# Patient Record
Sex: MALE | Race: WHITE | Employment: OTHER | URBAN - METROPOLITAN AREA
[De-identification: names, ages, dates, MRNs, and addresses within clinical notes are randomized per-mention and may not be internally consistent; named-entity substitution may affect disease eponyms.]

---

## 2017-04-03 ENCOUNTER — TELEPHONE (OUTPATIENT)
Dept: VASCULAR SURGERY | Age: 72
End: 2017-04-03

## 2017-04-03 NOTE — TELEPHONE ENCOUNTER
Patient called to cancel his studies and follow up appointment until further notice, states he's having some personal issues.

## 2017-05-25 ENCOUNTER — OFFICE VISIT (OUTPATIENT)
Dept: VASCULAR SURGERY | Age: 72
End: 2017-05-25

## 2017-05-25 DIAGNOSIS — I65.21 CAROTID STENOSIS, ASYMPTOMATIC, RIGHT: ICD-10-CM

## 2017-05-25 NOTE — PROCEDURES
[facility]   *** FINAL REPORT ***    Name: Garland Flowers  MRN: [id number]       Outpatient  : 05 Dec 1945  HIS Order #: [id number]  [address] [facility] Visit #: [id number]  Date: 25 May 2017    TYPE OF TEST: Cerebrovascular Duplex    REASON FOR TEST  Carotid stenosis    Right Carotid:-             Proximal               Mid                 Distal  cm/s  Systolic  Diastolic  Systolic  Diastolic  Systolic  Diastolic  CCA:     04.5      14.0       81.0      16.0       85.0      16.0  Bulb:   204.0      49.0  ECA:    109.0      17.0  ICA:    256.0      42.0       66.0      15.0       59.0      17.0  ICA/CCA:  3.0       2.6    ICA Stenosis: 50-69%    Right Vertebral:-  Finding: Antegrade  Sys:       50.0  Sheba:       10.0    Right Subclavian:    Left Carotid:-            Proximal                Mid                 Distal  cm/s  Systolic  Diastolic  Systolic  Diastolic  Systolic  Diastolic  CCA:     85.8      17.0       74.0      16.0       62.0      10.0  Bulb:    72.0      15.0  ECA:    113.0      16.0  ICA:     60.0      11.0       89.0      24.0       87.0      24.0  ICA/CCA:  1.0       1.4    ICA Stenosis: <50%    Left Vertebral:-  Finding: Antegrade  Sys:       54.0  Sheba:       15.0    Left Subclavian:    INTERPRETATION/FINDINGS  Duplex images were obtained using 2-D gray scale, color flow and  spectral doppler analysis. 1. Moderate 50-69% stenosis in the right internal carotid artery. 2. Mild plaquing of the left internal carotid artery in the less than  50% range. 3. No significant stenosis in the external carotid arteries  bilaterally. 4. Antegrade flow in both vertebral arteries. Plaque Morphology:  1. Heterogeneous plaque in the bulb and right ICA. 2. Heterogeneous plaque in the bulb and left ICA. Compared to the previous study on 16 the velocities have continued   to increase in the right ICA with the disease remaining in the  moderate range.     ADDITIONAL COMMENTS    I have personally reviewed the data relevant to the interpretation of  this  study. TECHNOLOGIST: Ousmane Quiros RVT, RICARDO  Signed: 05/25/2017 11:39 AM    PHYSICIAN: Dandy Nayak D.O.   Signed: 05/26/2017 01:18 PM

## 2017-05-30 ENCOUNTER — OFFICE VISIT (OUTPATIENT)
Dept: VASCULAR SURGERY | Age: 72
End: 2017-05-30

## 2017-05-30 VITALS
DIASTOLIC BLOOD PRESSURE: 62 MMHG | SYSTOLIC BLOOD PRESSURE: 130 MMHG | WEIGHT: 195 LBS | BODY MASS INDEX: 27.92 KG/M2 | HEART RATE: 72 BPM | RESPIRATION RATE: 18 BRPM | HEIGHT: 70 IN

## 2017-05-30 DIAGNOSIS — I65.23 CAROTID STENOSIS, ASYMPTOMATIC, BILATERAL: Primary | ICD-10-CM

## 2017-05-30 NOTE — PROGRESS NOTES
Valdez Calderón    Chief Complaint   Patient presents with   [facility] Carotid Artery Stenosis       History and Physical    Mr Bee Barrera is here for yearly carotid surveillance. He has been coming for a number of years after a screening baseline doppler had shown some moderate disease. He has remained stable within that range that we just continue with yearly check ups. He is otherwise healthy with his risk factors well controlled. He continues to deny any claudication or leg edema    Past Medical History:   Diagnosis Date    Arthropathy, unspecified, other specified sites     Back pain     Diabetes mellitus ([address]     Easy bruising     Hypertension     Metabolic disease     Skin rash     Vision decreased      Patient Active Problem List   Diagnosis Code    Hypertension I10    Diabetes mellitus ([address] A00.3    Metabolic disease S39.3    Vision decreased H54.7    Arthropathy, unspecified, other specified sites M12.9    Back pain M54.9    Skin rash R21    Easy bruising R23.8     History reviewed. No pertinent surgical history. Current Outpatient Prescriptions   Medication Sig Dispense Refill    glyBURIDE-metFORMIN (GLUCOVANCE) 5-500 mg per tablet Take 1 Tab by mouth Daily (before breakfast).  methimazole (TAPAZOLE) 5 mg tablet Take  by mouth.  CHOLECALCIFEROL, VITAMIN D3, (VITAMIN D3 PO) Take  by mouth.  lisinopril (PRINIVIL) 5 mg tablet Take 40 mg by mouth daily.  simvastatin (ZOCOR) 40 mg tablet Take  by mouth nightly.  metoprolol (LOPRESSOR) 50 mg tablet Take  by mouth two (2) times a day.  Aspirin, Buffered 81 mg Tab Take  by mouth.  alendronate (FOSAMAX) 35 mg tablet Take  by mouth every seven (7) days.  CALCIUM CARBONATE (CALTRATE 600 PO) Take  by mouth.          No Known Allergies    Review of Systems    Review of Systems - History obtained from the patient  General ROS: negative  Ophthalmic ROS: negative  Psychological ROS: negative  Respiratory ROS: negative  Cardiovascular ROS: negative  Gastrointestinal ROS: negative  Musculoskeletal ROS: negative  Neurological ROS: no TIA or stroke symptoms  Dermatological ROS: negative  Vascular ROS: no claudication or leg edema    Physical   Visit Vitals    /62 (BP 1 Location: Left arm, BP Patient Position: Sitting)    Pulse 72    Resp 18    Ht 5' 10\" (1.778 m)    Wt 195 lb (88.5 kg)    BMI 27.98 kg/m2     General:   Alert, cooperative, no distress.    Head:   Normocephalic, without obvious abnormality, atraumatic.    Eyes:     Conjunctivae/corneas clear. Pupils equal, round, reactive to light. Extraocular movements intact.    Neck:          Right sided bruit    Lungs:    Clear to auscultation bilaterally.    Heart:   Regular rate and rhythm, S1, S2 normal   Extremities:  Extremities normal, atraumatic, no cyanosis or edema.    Pulses:  2+ and symmetric all extremities.    Skin:  Skin color, texture, turgor normal. No rashes or lesions       Vascular studies:  1. Moderate 50-69% stenosis in the right internal carotid artery. 2. Mild plaquing of the left internal carotid artery in the less than  50% range. 3. No significant stenosis in the external carotid arteries  bilaterally. 4. Antegrade flow in both vertebral arteries. Plaque Morphology:  1. Heterogeneous plaque in the bulb and right ICA. 2. Heterogeneous plaque in the bulb and left ICA. Compared to the previous study on 4-4-16 the velocities have continued  to increase in the right ICA with the disease remaining in the  moderate range    Impression/Plan:     ICD-10-CM ICD-9-CM    1. Carotid stenosis, asymptomatic, bilateral I65.23 433.10 DUPLEX CAROTID BILATERAL AMB     433.30      Orders Placed This Encounter    DUPLEX CAROTID BILATERAL AMB     Reviewed results over last few years.    In spite of noted increase in velocities as stated above, there has been mild fluctuations since at least review back to his 2012 results, hovering perhaps just below or just above 50% stenosis. These current velocities are higher than last year, but lower than 2012. So I would say he is reasonable stable and can continue yearly surveillance   Total time spent was 15 minutes    Follow-up Disposition:  Return in about 1 year (around 5/30/2018). CHARANJIT Khan    Portions of this note have been entered using voice recognition software.

## 2018-05-14 ENCOUNTER — OFFICE VISIT (OUTPATIENT)
Dept: VASCULAR SURGERY | Age: 73
End: 2018-05-14

## 2018-05-14 DIAGNOSIS — I65.23 CAROTID STENOSIS, ASYMPTOMATIC, BILATERAL: ICD-10-CM

## 2018-05-31 ENCOUNTER — OFFICE VISIT (OUTPATIENT)
Dept: VASCULAR SURGERY | Age: 73
End: 2018-05-31

## 2018-05-31 VITALS
DIASTOLIC BLOOD PRESSURE: 80 MMHG | WEIGHT: 195 LBS | HEIGHT: 70 IN | BODY MASS INDEX: 27.92 KG/M2 | SYSTOLIC BLOOD PRESSURE: 140 MMHG | HEART RATE: 76 BPM

## 2018-05-31 DIAGNOSIS — I65.21 CAROTID STENOSIS, ASYMPTOMATIC, RIGHT: Primary | ICD-10-CM

## 2018-05-31 DIAGNOSIS — R09.89 RIGHT CAROTID BRUIT: ICD-10-CM

## 2018-05-31 RX ORDER — METFORMIN HYDROCHLORIDE 500 MG/1
500 TABLET ORAL
Qty: 1 TAB | Refills: 0 | Status: SHIPPED | OUTPATIENT
Start: 2018-05-31

## 2018-05-31 NOTE — MR AVS SNAPSHOT
[address] [facility] 
[phone] Patient: Diane Devries MRN: [id number] NNA:39/8/2029 Visit Information Date & Time Provider Department Dept. Phone Encounter #  
 5/31/2018 10:00 AM [facility] [phone] [id number] Follow-up Instructions Return in about 1 year (around 5/31/2019). Your Appointments 5/30/2019 10:00 AM  
PROCEDURE with [facility] 2 [facility] ([facility]) Appt Note: cv 1 yr/knaak [address] [facility] [address] [facility]  
[phone] [facility] [facility]  
  
    
 6/6/2019 11:30 AM  
Follow Up with [facility] ([facility]) Appt Note: 1 yr follow up after studies [address] [facility] [address] [facility]  
[phone] [address] [facility] [address] [facility] Upcoming Health Maintenance Date Due Hepatitis C Screening 1945 FOOT EXAM Q1 12/5/1955 EYE EXAM RETINAL OR DILATED Q1 12/5/1955 FOBT Q 1 YEAR AGE 50-75 12/5/1995 ZOSTER VACCINE AGE 60> 10/5/2005 GLAUCOMA SCREENING Q2Y 12/5/2010 Pneumococcal 65+ Low/Medium Risk (1 of 2 - PCV13) 12/5/2010 HEMOGLOBIN A1C Q6M 3/16/2011 MICROALBUMIN Q1 6/2/2011 LIPID PANEL Q1 9/14/2011 Influenza Age 5 to Adult 8/1/2018 DTaP/Tdap/Td series (2 - Td) 12/23/2025 Allergies as of 5/31/2018  Review Complete On: 5/31/2018 By: Ethel Cornell LPN No Known Allergies Current Immunizations  Never Reviewed Name Date Tdap 12/23/2015  2:38 AM  
  
 Not reviewed this visit You Were Diagnosed With   
  
 Codes Comments Carotid stenosis, asymptomatic, right    -  Primary ICD-10-CM: F81.81 ICD-9-CM: 433.10 Vitals BP Pulse Height(growth percentile) Weight(growth percentile) BMI Smoking Status 140/80 (BP 1 Location: Left arm, BP Patient Position: Sitting) 76 5' 10\" (1.778 m) 195 lb (88.5 kg) 27.98 kg/m2 Never Smoker BMI and BSA Data Body Mass Index Body Surface Area  
 27.98 kg/m 2 2.09 m 2 Your Updated Medication List  
  
   
This list is accurate as of 5/31/18 10:30 AM.  Always use your most recent med list.  
  
  
  
  
 aspirin, buffered 81 mg Tab Take  by mouth. CALTRATE 600 PO Take  by mouth. FOSAMAX 35 mg tablet Generic drug:  alendronate Take  by mouth every seven (7) days. LOPRESSOR 50 mg tablet Generic drug:  metoprolol tartrate Take  by mouth two (2) times a day. metFORMIN 500 mg tablet Commonly known as:  GLUCOPHAGE Take 1 Tab by mouth daily (with breakfast). methIMAzole 5 mg tablet Commonly known as:  TAPAZOLE Take  by mouth. PRINIVIL 5 mg tablet Generic drug:  lisinopril Take 40 mg by mouth daily. VITAMIN D3 PO Take  by mouth. ZOCOR 40 mg tablet Generic drug:  simvastatin Take  by mouth nightly. Prescriptions Printed Refills  
 metFORMIN (GLUCOPHAGE) 500 mg tablet 0 Sig: Take 1 Tab by mouth daily (with breakfast). Class: Print Route: Oral  
  
Follow-up Instructions Return in about 1 year (around 5/31/2019). To-Do List   
 05/31/2019 Imaging:  DUPLEX CAROTID BILATERAL AMB Introducing [facility]! [employer] introduces [employer] patient portal. Now you can access parts of your medical record, email your doctor's office, and request medication refills online. 1. In your internet browser, go to [email or web address] 2. Click on the First Time User? Click Here link in the Sign In box. You will see the New Member Sign Up page. 3. Enter your [employer] Access Code exactly as it appears below.  You will not need to use this code after youve completed the sign-up process. If you do not sign up before the expiration date, you must request a new code. · [employer] Access Code: [email or web address] Expires: 8/12/2018  9:37 AM 
 
4. Enter the last four digits of your Social Security Number (xxxx) and Date of Birth (mm/dd/yyyy) as indicated and click Submit. You will be taken to the next sign-up page. 5. Create a [employer] ID. This will be your [employer] login ID and cannot be changed, so think of one that is secure and easy to remember. 6. Create a [employer] password. You can change your password at any time. 7. Enter your Password Reset Question and Answer. This can be used at a later time if you forget your password. 8. Enter your e-mail address. You will receive e-mail notification when new information is available in [address] 9. Click Sign Up. You can now view and download portions of your medical record. 10. Click the Download Summary menu link to download a portable copy of your medical information. If you have questions, please visit the Frequently Asked Questions section of the [employer] website. Remember, [employer] is NOT to be used for urgent needs. For medical emergencies, dial 911. Now available from your iPhone and Android! Please provide this summary of care documentation to your next provider. Your primary care clinician is listed as Kristi Jang. If you have any questions after today's visit, please call [phone].

## 2018-05-31 NOTE — PROGRESS NOTES
Jessica Rapp    Chief Complaint   Patient presents with   [facility] Carotid Artery Stenosis       History and Physical    Mr Anjali Patrick is here for yearly carotid surveillance. He has been coming for a number of years after a screening baseline doppler had shown some moderate disease. He has remained stable within that range that we just continue with yearly check ups. I was able to retreive results back to 2012, which have been stable  He is otherwise healthy with his risk factors well controlled. He continues to deny any claudication or leg edema  Over the course of the last 2 years with diet and exercise (mostly diet with portion control and health choices) he has gone from 230 to 178 lbs and feels great    Past Medical History:   Diagnosis Date    Arthropathy, unspecified, other specified sites     Back pain     Diabetes mellitus ([address]     Easy bruising     Hypertension     Metabolic disease     Skin rash     Vision decreased      Patient Active Problem List   Diagnosis Code    Hypertension I10    Diabetes mellitus ([address] S51.4    Metabolic disease R79.2    Vision decreased H54.7    Arthropathy, unspecified, other specified sites M12.9    Back pain M54.9    Skin rash R21    Easy bruising R23.8     History reviewed. No pertinent surgical history. Current Outpatient Prescriptions   Medication Sig Dispense Refill    metFORMIN (GLUCOPHAGE) 500 mg tablet Take 1 Tab by mouth daily (with breakfast). 1 Tab 0    methimazole (TAPAZOLE) 5 mg tablet Take  by mouth.  CHOLECALCIFEROL, VITAMIN D3, (VITAMIN D3 PO) Take  by mouth.  lisinopril (PRINIVIL) 5 mg tablet Take 40 mg by mouth daily.  simvastatin (ZOCOR) 40 mg tablet Take  by mouth nightly.  metoprolol (LOPRESSOR) 50 mg tablet Take  by mouth two (2) times a day.  Aspirin, Buffered 81 mg Tab Take  by mouth.  alendronate (FOSAMAX) 35 mg tablet Take  by mouth every seven (7) days.         CALCIUM CARBONATE (CALTRATE 600 PO) Take  by mouth. No Known Allergies    Review of Systems    Review of Systems - History obtained from the patient  General ROS: negative  Ophthalmic ROS: negative  Psychological ROS: negative  Respiratory ROS: negative  Cardiovascular ROS: negative  Gastrointestinal ROS: negative  Musculoskeletal ROS: negative  Neurological ROS: no TIA or stroke symptoms  Dermatological ROS: negative  Vascular ROS: no claudication or leg edema    Physical   Visit Vitals    /80 (BP 1 Location: Left arm, BP Patient Position: Sitting)    Pulse 76    Ht 5' 10\" (1.778 m)    Wt 195 lb (88.5 kg)    BMI 27.98 kg/m2     General:   Alert, cooperative, no distress.    Head:   Normocephalic, without obvious abnormality, atraumatic.    Eyes:     Conjunctivae/corneas clear. Pupils equal, round, reactive to light. Extraocular movements intact.    Neck:          Right sided bruit    Lungs:    Clear to auscultation bilaterally.    Heart:   Regular rate and rhythm, S1, S2 normal   Extremities:  Extremities normal, atraumatic, no cyanosis or edema.    Pulses:  2+ and symmetric all extremities.    Skin:  Skin color, texture, turgor normal. No rashes or lesions       Vascular studies:  1. Moderate 50-69% stenosis in the right internal carotid artery. 2. Minimal plaquing visualized with <50% stenosis by criteria in the  left internal carotid artery. 3. No significant stenosis in the external carotid arteries  bilaterally. 4. Antegrade flow in both vertebral arteries. Plaque Morphology:  1. Irregular hyperechoic plaque in the bulb and right ICA. 2. Homogeneous plaque in the bulb and left ICA. Compared to prior exam, there is no significant change.       Impression/Plan:     ICD-10-CM ICD-9-CM    1. Carotid stenosis, asymptomatic, right I65.21 433.10 metFORMIN (GLUCOPHAGE) 500 mg tablet      DUPLEX CAROTID BILATERAL AMB   2.  Right carotid bruit R09.89 785.9      Orders Placed This Encounter    DUPLEX CAROTID BILATERAL AMB    metFORMIN (GLUCOPHAGE) 500 mg tablet     Reassured of overall stability of results since 2012 and that can continue yearly surveillance     Follow-up Disposition:  Return in about 1 year (around 5/31/2019). CHARANJIT Newberry    Portions of this note have been entered using voice recognition software.

## 2019-06-26 ENCOUNTER — TELEPHONE (OUTPATIENT)
Dept: CARDIOLOGY CLINIC | Age: 74
End: 2019-06-26

## 2019-06-26 NOTE — TELEPHONE ENCOUNTER
Patient had new pt appt with DR Hubert Modi 6/27/19 note stated referred by Dr. Nancy Petersen. .. I called pt to confirm appt pt states he did not need this appt that he has a cardiologist Dr Alex Freitas. .. Pt canceled appt. I called Dr Jamir Graf office spoke to [facility] made her aware she said she will forward note to Dr Nancy Petersen to make her aware.   Tea Farooq

## 2019-07-12 ENCOUNTER — OFFICE VISIT (OUTPATIENT)
Dept: VASCULAR SURGERY | Age: 74
End: 2019-07-12

## 2019-07-12 VITALS
WEIGHT: 195 LBS | RESPIRATION RATE: 17 BRPM | SYSTOLIC BLOOD PRESSURE: 120 MMHG | HEART RATE: 88 BPM | HEIGHT: 70 IN | DIASTOLIC BLOOD PRESSURE: 60 MMHG | BODY MASS INDEX: 27.92 KG/M2

## 2019-07-12 DIAGNOSIS — I65.21 CAROTID STENOSIS, ASYMPTOMATIC, RIGHT: Primary | ICD-10-CM

## 2019-07-12 RX ORDER — ATORVASTATIN CALCIUM 20 MG/1
TABLET, FILM COATED ORAL DAILY
COMMUNITY
End: 2022-10-27 | Stop reason: SDUPTHER

## 2019-07-12 NOTE — PROGRESS NOTES
1. Have you been to an emergency room or urgent care clinic since your last visit? NO    Hospitalized since your last visit? If yes, where, when, and reason for visit? NO  2. Have you seen or consulted any other health care providers outside of the [facility] since your last visit including any procedures, health maintenance items. If yes, where, when and reason for visit?  NO

## 2019-07-12 NOTE — PROGRESS NOTES
Margie Yi    Chief Complaint   Patient presents with    Carotid Artery Stenosis       History and Physical    66-year-old male here today for follow-up evaluation regarding his carotid surveillance. He is a healthy youthful male treated for diabetes and hypertension. He tells me he has had no TIAs no feeling of mini stroke or strokes. He has no chest pain no shortness of breath. Stays very active and feels well. He takes Lipitor and aspirin. He is a non-smoker. He has no claudication    Past Medical History:   Diagnosis Date    Arthropathy, unspecified, other specified sites     Back pain     Diabetes mellitus ([address]     Easy bruising     Hypertension     Metabolic disease     Skin rash     Vision decreased      Patient Active Problem List   Diagnosis Code    Hypertension I10    Diabetes mellitus ([facility] Z31.5    Metabolic disease B69.0    Vision decreased H54.7    Arthropathy, unspecified, other specified sites M12.9    Back pain M54.9    Skin rash R21    Easy bruising R23.8     History reviewed. No pertinent surgical history. Current Outpatient Medications   Medication Sig Dispense Refill    atorvastatin (LIPITOR) 20 mg tablet Take  by mouth daily.  metFORMIN (GLUCOPHAGE) 500 mg tablet Take 1 Tab by mouth daily (with breakfast). 1 Tab 0    methimazole (TAPAZOLE) 5 mg tablet Take  by mouth.  CHOLECALCIFEROL, VITAMIN D3, (VITAMIN D3 PO) Take  by mouth.  lisinopril (PRINIVIL) 5 mg tablet Take 40 mg by mouth daily.  metoprolol (LOPRESSOR) 50 mg tablet Take  by mouth two (2) times a day.  Aspirin, Buffered 81 mg Tab Take  by mouth.  alendronate (FOSAMAX) 35 mg tablet Take  by mouth every seven (7) days.  CALCIUM CARBONATE (CALTRATE 600 PO) Take  by mouth.  simvastatin (ZOCOR) 40 mg tablet Take  by mouth nightly.          No Known Allergies    Review of Systems    A full review of systems was completed times ten organ systems and was deemed negative unless otherwise mentioned in the HPI. Physical   Visit Vitals  /60 (BP 1 Location: Left arm, BP Patient Position: Sitting)   Pulse 88   Resp 17   Ht 5' 10\" (1.778 m)   Wt 195 lb (88.5 kg)   BMI 27.98 kg/m²       Youthful 73 no distress  Head is normocephalic no facial symmetry pupils are equal  Neck no JVD  Chest clear  Cardiac regular  Abdomen soft flat nontender  Extremities range of motion strength are equal is no edema  No skin ulcerations  No signs of acute arterial insufficiency  Doppler study shows moderate atherosclerosis of the right with peak systolic velocity of 614 (continues to decrease in parentheses mild on the left    Impression/Plan:     ICD-10-CM ICD-9-CM    1. Carotid stenosis, asymptomatic, right I65.21 433.10 DUPLEX CAROTID BILATERAL     Orders Placed This Encounter    atorvastatin (LIPITOR) 20 mg tablet       Follow-up and Dispositions    · Return in about 2 years (around 7/12/2021). Moderate carotid stenosis responds well to best medical therapy. Continue current medications. We will follow-up with surveillance ultrasound in 2 years. Oj Enriquez MD    PLEASE NOTE:  This document has been produced using voice recognition software. Unrecognized errors in transcription may be present.

## 2019-07-12 NOTE — LETTER
7/12/19 Patient: Shreyas Levine YOB: 1945 Date of Visit: 7/12/2019 Yulisa Henderson MD 
[address] [address] VIA Facsimile: [phone] Dear Yulisa Henderson MD, Thank you for referring Mr. Ernesto Perez to [facility] for evaluation. My notes for this consultation are attached. If you have questions, please do not hesitate to call me. I look forward to following your patient along with you. Sincerely, Corin Poole MD

## 2019-08-13 ENCOUNTER — OFFICE VISIT (OUTPATIENT)
Dept: CARDIOLOGY CLINIC | Age: 74
End: 2019-08-13

## 2019-08-13 VITALS
SYSTOLIC BLOOD PRESSURE: 150 MMHG | DIASTOLIC BLOOD PRESSURE: 76 MMHG | BODY MASS INDEX: 25.62 KG/M2 | HEIGHT: 70 IN | WEIGHT: 179 LBS | HEART RATE: 68 BPM | OXYGEN SATURATION: 98 %

## 2019-08-13 DIAGNOSIS — E78.00 HYPERCHOLESTEREMIA: ICD-10-CM

## 2019-08-13 DIAGNOSIS — I65.21 STENOSIS OF RIGHT CAROTID ARTERY: ICD-10-CM

## 2019-08-13 DIAGNOSIS — I10 ESSENTIAL HYPERTENSION: ICD-10-CM

## 2019-08-13 DIAGNOSIS — Z95.1 HISTORY OF CORONARY ARTERY BYPASS GRAFT: Primary | ICD-10-CM

## 2019-08-13 DIAGNOSIS — I35.0 NONRHEUMATIC AORTIC VALVE STENOSIS: ICD-10-CM

## 2019-08-13 NOTE — PROGRESS NOTES
HISTORY OF PRESENT ILLNESS  Joana Ash is a 68 y.o. male. HPI    He is referred to my office for continued cardiac care. He has been feeling well. He has had no cardiac symptoms. He has had no chest pain, dyspnea, orthopnea, PND. He denies palpitations, dizziness or syncope. He has had no symptoms to indicate claudication, TIA or amaurosis fugax. He has known history of coronary artery disease. He underwent cardiac catheterization on 10/29/19 which demonstrated   1. 90% stenosis of left main trunk in the mid segment  2. 40% tubular narrowing of proximal LAD  3. Mild left circumflex disease  4. Diffuse 60% proximal mid PDA otherwise patent RCA with mild diffuse disease  5. Normal LV function with EF in the 60% range. He underwent CABG x two on 10/29/18 which consisted of   1. LIMA to LAD  2. Single SVG to obtuse marginal branch of the circumflex artery. He also has known carotid artery stenosis with 50-69% stenosis of the right internal carotid artery and < 50% stenosis of the left internal carotid artery. He is a nonsmoker. He has a history of hypertension, diabetes mellitus and hypercholesterolemia. He denies a family history of coronary artery disease. Review of Systems   Constitutional: Negative for malaise/fatigue and weight loss. HENT: Negative for hearing loss. Eyes: Negative for blurred vision and double vision. Respiratory: Positive for shortness of breath. Cardiovascular: Negative for chest pain, palpitations, orthopnea, claudication, leg swelling and PND. Gastrointestinal: Negative for blood in stool, heartburn and melena. Genitourinary: Positive for frequency. Negative for dysuria, hematuria and urgency. Musculoskeletal: Negative for back pain and joint pain. Skin: Negative for itching and rash. Neurological: Negative for dizziness and loss of consciousness. Psychiatric/Behavioral: Negative for depression and memory loss.        Physical Exam Constitutional: He is oriented to person, place, and time. He appears well-developed and well-nourished. HENT:   Head: Normocephalic and atraumatic. Eyes: Pupils are equal, round, and reactive to light. Conjunctivae are normal.   Neck: Normal range of motion. Neck supple. No JVD present. Cardiovascular: Normal rate, regular rhythm, S1 normal and S2 normal.  No extrasystoles are present. PMI is not displaced. Exam reveals no gallop and no friction rub. Murmur heard. Harsh early systolic murmur is present with a grade of 2/6 at the upper right sternal border radiating to the neck. Pulses:       Carotid pulses are 3+ on the right side with bruit, and 3+ on the left side with bruit. Pulmonary/Chest: Effort normal. He has no rales. Abdominal: Soft. There is no tenderness. Musculoskeletal: He exhibits no edema. Neurological: He is alert and oriented to person, place, and time. No cranial nerve deficit. Skin: Skin is warm. Psychiatric: He has a normal mood and affect.  His behavior is normal.     Visit Vitals  /76   Pulse 68   Ht 5' 10\" (1.778 m)   Wt 81.2 kg (179 lb)   SpO2 98%   BMI 25.68 kg/m²       Past Medical History:   Diagnosis Date    Arthropathy, unspecified, other specified sites     Back pain     Diabetes mellitus (HCC)     Easy bruising     Hypertension     Metabolic disease     Skin rash     Vision decreased        Social History     Socioeconomic History    Marital status:      Spouse name: Not on file    Number of children: Not on file    Years of education: Not on file    Highest education level: Not on file   Occupational History    Not on file   Social Needs    Financial resource strain: Not on file    Food insecurity:     Worry: Not on file     Inability: Not on file    Transportation needs:     Medical: Not on file     Non-medical: Not on file   Tobacco Use    Smoking status: Never Smoker    Smokeless tobacco: Never Used   Substance and Sexual Activity    Alcohol use: Yes    Drug use: Not on file    Sexual activity: Not on file   Lifestyle    Physical activity:     Days per week: Not on file     Minutes per session: Not on file    Stress: Not on file   Relationships    Social connections:     Talks on phone: Not on file     Gets together: Not on file     Attends Yarsanism service: Not on file     Active member of club or organization: Not on file     Attends meetings of clubs or organizations: Not on file     Relationship status: Not on file    Intimate partner violence:     Fear of current or ex partner: Not on file     Emotionally abused: Not on file     Physically abused: Not on file     Forced sexual activity: Not on file   Other Topics Concern    Not on file   Social History Narrative    Not on file       No family history on file. No past surgical history on file. Current Outpatient Medications   Medication Sig Dispense Refill    atorvastatin (LIPITOR) 20 mg tablet Take  by mouth daily.  metFORMIN (GLUCOPHAGE) 500 mg tablet Take 1 Tab by mouth daily (with breakfast). (Patient taking differently: Take 250 mg by mouth two (2) times a day.) 1 Tab 0    methimazole (TAPAZOLE) 5 mg tablet Take 10 mg by mouth every Monday, Wednesday, Friday.  CHOLECALCIFEROL, VITAMIN D3, (VITAMIN D3 PO) Take  by mouth.  lisinopril (PRINIVIL) 5 mg tablet Take 40 mg by mouth daily.  metoprolol (LOPRESSOR) 50 mg tablet Take  by mouth two (2) times a day. Indications: PATIENT TAKES 100MG IN THE AM 50MG IN THE PM      Aspirin, Buffered 81 mg Tab Take  by mouth.  alendronate (FOSAMAX) 35 mg tablet Take  by mouth every seven (7) days.  simvastatin (ZOCOR) 40 mg tablet Take  by mouth nightly.  CALCIUM CARBONATE (CALTRATE 600 PO) Take  by mouth. EKG: unchanged from previous tracings, normal sinus rhythm, nonspecific ST and T waves changes, incomplete RBBB  .   ASSESSMENT and PLAN  Encounter Diagnoses   Name Primary?  History of coronary artery bypass graft,CABGx2 2009 Yes    Nonrheumatic aortic valve stenosis     Hypercholesteremia     Essential hypertension     Stenosis of right carotid artery    He has been doing quite well. He has had no symptoms to indicate angina or cardiac decompensation. He is 10 years following CABG and at this point I would proceed with follow up stress nuclear cardiac imaging. He does have a murmur of mild aortic stenosis and this will need to be followed in the future primarily by clinical examination and occasional echocardiographic follow up. He does have moderately severe carotid artery stenosis which has been followed by Dr. Ban Blanco, a vascular surgeon. We discussed the importance of continued preventive measures and he will be continued on statin and aspirin. He was advised to continue to exercise regularly and stay on a diet as he has been. His blood pressure seems to have been under control. If his blood pressure becomes out of control, I would consider replacing metoprolol with carvedilol which does not appear to be necessary at this time.

## 2019-08-13 NOTE — PATIENT INSTRUCTIONS
If you have not heard from the central scheduler to schedule your testing in 48 hours, please call [phone].

## 2019-08-13 NOTE — PROGRESS NOTES
Mariah Gudino presents today for   Chief Complaint   Patient presents with    Heart Problem     Previous hx of CABG 2008 at [address]     with exertion        Mariah Gudino preferred language for health care discussion is english/other. Is someone accompanying this pt? yes    Is the patient using any DME equipment during [address]? no    Depression Screening:  3 most recent PHQ Screens 7/12/2019   Little interest or pleasure in doing things Not at all   Feeling down, depressed, irritable, or hopeless Not at all   Total Score PHQ 2 0       Learning Assessment:  Learning Assessment 7/12/2019   PRIMARY LEARNER Patient   PRIMARY LANGUAGE ENGLISH   LEARNER PREFERENCE PRIMARY LISTENING   ANSWERED BY paul   RELATIONSHIP SELF       Abuse Screening:  Abuse Screening Questionnaire 8/13/2019   Do you ever feel afraid of your partner? N   Are you in a relationship with someone who physically or mentally threatens you? N   Is it safe for you to go home? Y       Fall Risk  Fall Risk Assessment, last 12 mths 7/12/2019   Able to walk? Yes   Fall in past 12 months? No       Pt currently taking Anticoagulant therapy? no    Coordination of Care:  1. Have you been to the ER, urgent care clinic since your last visit? Hospitalized since your last visit? no    2. Have you seen or consulted any other health care providers outside of the [address] [place] [address] since your last visit? Include any pap smears or colon screening.  no

## 2020-05-18 ENCOUNTER — OFFICE VISIT (OUTPATIENT)
Dept: ORTHOPEDIC SURGERY | Age: 75
End: 2020-05-18

## 2020-05-18 VITALS
HEART RATE: 82 BPM | BODY MASS INDEX: 24.97 KG/M2 | DIASTOLIC BLOOD PRESSURE: 76 MMHG | WEIGHT: 174.4 LBS | HEIGHT: 70 IN | SYSTOLIC BLOOD PRESSURE: 145 MMHG | TEMPERATURE: 97.8 F | RESPIRATION RATE: 16 BRPM | OXYGEN SATURATION: 97 %
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
## 2020-05-18 DIAGNOSIS — R26.89 BALANCE PROBLEM: ICD-10-CM

## 2020-05-18 NOTE — PROGRESS NOTES
[address]  [address], [address]  [facility], [address]  Phone: [phone]  Fax: [phone]  NEW PATIENT  Patient: Ana Howell                MRN: [id number]       SSN: [id number]  YOB: 1945        AGE: 76 y.o. SEX: male  Body mass index is 25.02 kg/m². PCP: Shanae Rucker MD  05/18/20    Chief Complaint   Patient presents with    Back Pain     NP ref by Dr. Alem Schaefer due to fall     Ana Howell is seen today in consultation at the request of Dr. Alem Schaefer for complaints of back pain     HISTORY OF PRESENT ILLNESS:  Ana Howell is a 76 y.o.  male with history of acute back pain for 2 weeks after a fall 4 wks ago after tripping over his dog and fell on his buttocks and he started having lower back pain around the L4 region w/out any radicular symptoms. He comes in today because Dr. Alem Schaefer did some x-rays and thought he may have sustained a lumbar Fx. I do not have access to his x-rays. He comes in today pain free. He reports that the back pain resolved after 2 wks after the fall with some OTC NSAIDs. It was a 3/10, dull achy pain. Prior history of back problems: recurrent self limited episodes of low back pain in the past.  He has tried; PT-No,  Non-opioid medications Yes, and spinal injections No. He is totally pain free and is exam is benign except a + Romberg test for some balance issues that he's had over the last several yrs. He reports that he had a baseline DEXA about a yr ago. Denies bladder/bowel dysfunction, saddle paresthesia, weakness, gait disturbance, or other neurological deficits. Denies chills, fever,night sweats, unexplained weight loss/weight gain, chest pain, sob or anxiety. Pt at this time desires to  continue with current care/proceed with medication evaluation/proceed with evaluation of back pain.     Medications OTC Aleve    PMHx: DM    RADIOGRAPHS  Waiting on reports from Dr. Martinez [place] office for x-rays. ASSESSMENT   Diagnoses and all orders for this visit:    1. Fall, initial encounter    2. Balance problem         IMPRESSION AND PLAN:  This is a pt with acute now resolved back pain. We had a discussion about the nature of his pain. Given the resolution of his pain that was only a 3/10, it does not sound like he sustained an acute Fx. We discussed getting another set of x-rays or an MRI but he is pain free and we wouldn't do anything interventionally anyway. We discussed Sx and S that would warrant further follow-up. We discussed a HEP and his overall health and well being. > Pt was given information on back care   > HEP  > Mr. Juanito Adrian has a reminder for a \"due or due soon\" health maintenance. I have asked that he contact his primary care provider, Oscar Fatima MD, for follow-up on this health maintenance.  > We have informed patient to notify us for immediate appointment if he has any worsening neurogical symptoms or if an emergency situation presents, then call 911  >  has been reviewed and is appropriate  > Pt will follow-up in PRN. Subjective    Work Retired    Smoking Status non smoker    Pain Scale: 0 - No pain/10    Pain Assessment  5/18/2020   Location of Pain Back   Severity of Pain 0   Quality of Pain Dull;Aching   Duration of Pain Other (Comment)   Frequency of Pain Intermittent   Aggravating Factors Other (Comment)   Aggravating Factors Comment lifting heavy objects   Limiting Behavior No   Relieving Factors Rest   Result of Injury Yes   Work-Related Injury No   Type of Injury Fall         REVIEW OF SYSTEMS  Constitutional: Negative for fever, chills, or weight change. Respiratory: Negative for cough or shortness of breath. Cardiovascular: Negative for chest pain or palpitations. Gastrointestinal: Negative for incontinence, acid reflux, change in bowel habits, or constipation. Genitourinary: Negative for incontinence, dysuria and flank pain.    Musculoskeletal: Positive for no pain. See HPI. Skin: Negative for rash. Neurological:no  radiculopathy. See HPI. Endo/Heme/Allergies: Negative. Psychiatric/Behavioral: Negative. PHYSICAL EXAMINATION  Visit Vitals  /76 (BP 1 Location: Right arm, BP Patient Position: Sitting)   Pulse 82   Temp 97.8 °F (36.6 °C) (Oral)   Resp 16   Ht 5' 10\" (1.778 m)   Wt 174 lb 6.4 oz (79.1 kg)   SpO2 97%   BMI 25.02 kg/m²         Accompanied by self. Constitutional:  Well developed, well nourished, in no acute distress. Psychiatric: Affect and mood are appropriate. Integumentary: No rashes or abrasions noted on exposed areas. Cardiovascular/Peripheral Vascular: +2 radial & pedal pulses. No peripheral edema is noted. Lymphatic:  No evidence of lymphedema. No cervical lymphadenopathy. SPINE/MUSCULOSKELETAL EXAM     Lumbar spine:  No rash, ecchymosis, or gross obliquity. No fasciculations. No focal atrophy is noted. Range of motion is intact w/no pain with flexion, extension, turning right, turning left. Tenderness to palpation none. No tenderness to palpation at the sciatic notch. SI joints non-tender. Trochanters non tender. Straight leg raise neg  Hip Impingement neg    Sensation grossly intact to light touch. MOTOR:     Hip Flex Quads Hamstrings Ankle DF EHL Ankle PF   Right 5/5 5/5 5/5 5/5 5/5 5/5   Left 5/5 5/5 5/5 5/5 5/5 5/5        Ambulation without assistive device. FWB. Positive Romberg        PAST MEDICAL HISTORY   Past Medical History:   Diagnosis Date    Arthropathy, unspecified, other specified sites     Back pain     Diabetes mellitus ([facility] [address]     Easy bruising     Hypertension     Metabolic disease     Skin rash     Vision decreased        History reviewed. No pertinent surgical history. Lissa Miller MEDICATIONS      Current Outpatient Medications   Medication Sig Dispense Refill    atorvastatin (LIPITOR) 20 mg tablet Take  by mouth daily.       metFORMIN (GLUCOPHAGE) 500 mg tablet Take 1 Tab by mouth daily (with breakfast). (Patient taking differently: Take 250 mg by mouth two (2) times a day.) 1 Tab 0    methimazole (TAPAZOLE) 5 mg tablet Take 10 mg by mouth every Monday, Wednesday, Friday.  CHOLECALCIFEROL, VITAMIN D3, (VITAMIN D3 PO) Take  by mouth.  lisinopril (PRINIVIL) 5 mg tablet Take 40 mg by mouth daily.  metoprolol (LOPRESSOR) 50 mg tablet Take  by mouth two (2) times a day. Indications: PATIENT TAKES 100MG IN THE AM 50MG IN THE PM      Aspirin, Buffered 81 mg Tab Take  by mouth.  alendronate (FOSAMAX) 35 mg tablet Take  by mouth every seven (7) days.  CALCIUM CARBONATE (CALTRATE 600 PO) Take  by mouth.  simvastatin (ZOCOR) 40 mg tablet Take  by mouth nightly. ALLERGIES  No Known Allergies       SOCIAL HISTORY    Social History     Socioeconomic History    Marital status:      Spouse name: Not on file    Number of children: Not on file    Years of education: Not on file    Highest education level: Not on file   Occupational History    Not on file   Social Needs    Financial resource strain: Not on file    Food insecurity     Worry: Not on file     Inability: Not on file    Transportation needs     Medical: Not on file     Non-medical: Not on file   Tobacco Use    Smoking status: Never Smoker    Smokeless tobacco: Never Used   Substance and Sexual Activity    Alcohol use:  Yes    Drug use: Not on file    Sexual activity: Not on file   Lifestyle    Physical activity     Days per week: Not on file     Minutes per session: Not on file    Stress: Not on file   Relationships    Social connections     Talks on phone: Not on file     Gets together: Not on file     Attends [employer] service: Not on file     Active member of club or organization: Not on file     Attends meetings of clubs or organizations: Not on file     Relationship status: Not on file    Intimate partner violence     Fear of current or ex partner: Not on file Emotionally abused: Not on file     Physically abused: Not on file     Forced sexual activity: Not on file   Other Topics Concern    Not on file   Social History Narrative    Not on file       FAMILY HISTORY  History reviewed. No pertinent family history.       Ying Major NP

## 2021-01-31 NOTE — PROCEDURES
Pt presents to the ED today with c/o wounds to the right inner thigh and fever of 101 for 2 days. Pt's father reports runny nose and cough. Pt was given ibuprofen at 0900 and tylenol last evening.    [facility]  *** FINAL REPORT ***    Name: Jerod Pearce  MRN: [id number]       Outpatient  : 05 Dec 1945  HIS Order #: [id number]  [address] [facility] Visit #: [id number]  Date: 14 May 2018    TYPE OF TEST: Cerebrovascular Duplex    REASON FOR TEST  Carotid disease    Right Carotid:-             Proximal               Mid                 Distal  cm/s  Systolic  Diastolic  Systolic  Diastolic  Systolic  Diastolic  CCA:     70.5      12.0       99.0      14.0       85.0      14.0  Bulb:   108.0      19.0  ECA:     10.8       7.0  ICA:    227.0      32.0       72.0      11.0       89.0      22.0  ICA/CCA:  2.9       2.7    ICA Stenosis: 50-69%    Right Vertebral:-  Finding: Antegrade  Sys:       68.0  Sheba:       13.0    Right Subclavian:    Left Carotid:-            Proximal                Mid                 Distal  cm/s  Systolic  Diastolic  Systolic  Diastolic  Systolic  Diastolic  CCA:    954.4      18.0                            71.0      18.0  Bulb:  ECA:     86.0       8.0  ICA:     60.0      11.0       87.0      21.0       74.0      17.0  ICA/CCA:  0.7       1.2    ICA Stenosis: <50%    Left Vertebral:-  Finding: Antegrade  Sys:       68.0  Sheba:       18.0    Left Subclavian:    INTERPRETATION/FINDINGS  Duplex images were obtained using 2-D gray scale, color flow and  spectral doppler analysis. 1. Moderate 50-69% stenosis in the right internal carotid artery. 2. Minimal plaquing visualized with <50% stenosis by criteria in the  left internal carotid artery. 3. No significant stenosis in the external carotid arteries  bilaterally. 4. Antegrade flow in both vertebral arteries. Plaque Morphology:  1. Irregular hyperechoic plaque in the bulb and right ICA. 2. Homogeneous plaque in the bulb and left ICA. Compared to prior exam, there is no significant change. ADDITIONAL COMMENTS    I have personally reviewed the data relevant to the interpretation of  this  study.     TECHNOLOGIST: David Pisano. Alissa Bernal  Signed: 05/14/2018 10:23 AM    PHYSICIAN: Donavon Storey MD  Signed: 05/14/2018 02:01 PM

## 2022-10-27 ENCOUNTER — OFFICE VISIT (OUTPATIENT)
Dept: CARDIOLOGY CLINIC | Age: 77
End: 2022-10-27
Payer: COMMERCIAL

## 2022-10-27 VITALS
WEIGHT: 186 LBS | HEART RATE: 64 BPM | HEIGHT: 70 IN | OXYGEN SATURATION: 99 % | DIASTOLIC BLOOD PRESSURE: 70 MMHG | SYSTOLIC BLOOD PRESSURE: 132 MMHG | BODY MASS INDEX: 26.63 KG/M2

## 2022-10-27 DIAGNOSIS — I10 ESSENTIAL HYPERTENSION: ICD-10-CM

## 2022-10-27 DIAGNOSIS — I35.0 NONRHEUMATIC AORTIC (VALVE) STENOSIS: ICD-10-CM

## 2022-10-27 DIAGNOSIS — Z95.1 HISTORY OF CORONARY ARTERY BYPASS GRAFT X 2: Primary | ICD-10-CM

## 2022-10-27 DIAGNOSIS — I65.21 STENOSIS OF RIGHT CAROTID ARTERY: ICD-10-CM

## 2022-10-27 DIAGNOSIS — E78.00 HYPERCHOLESTEREMIA: ICD-10-CM

## 2022-10-27 PROCEDURE — 1123F ACP DISCUSS/DSCN MKR DOCD: CPT | Performed by: INTERNAL MEDICINE

## 2022-10-27 PROCEDURE — 93000 ELECTROCARDIOGRAM COMPLETE: CPT | Performed by: INTERNAL MEDICINE

## 2022-10-27 PROCEDURE — 3074F SYST BP LT 130 MM HG: CPT | Performed by: INTERNAL MEDICINE

## 2022-10-27 PROCEDURE — 99204 OFFICE O/P NEW MOD 45 MIN: CPT | Performed by: INTERNAL MEDICINE

## 2022-10-27 PROCEDURE — 3078F DIAST BP <80 MM HG: CPT | Performed by: INTERNAL MEDICINE

## 2022-10-27 RX ORDER — ATORVASTATIN CALCIUM 40 MG/1
40 TABLET, FILM COATED ORAL DAILY
Qty: 30 TABLET | Refills: 6 | Status: SHIPPED | OUTPATIENT
Start: 2022-10-27

## 2022-10-27 RX ORDER — LISINOPRIL 40 MG/1
40 TABLET ORAL DAILY
COMMUNITY

## 2022-10-27 RX ORDER — ASCORBIC ACID 500 MG
500 TABLET ORAL DAILY
COMMUNITY

## 2022-10-27 NOTE — PROGRESS NOTES
Keny Fonseca presents today for   Chief Complaint   Patient presents with    New Patient     PCP ref back to re-establish care  Last seen Dr. Tre Herrera on 08-13-19       Keny Fonseca preferred language for health care discussion is english/other. Is someone accompanying this pt? yes    Is the patient using any DME equipment during [address]? no    Depression Screening:  3 most recent PHQ Screens 10/27/2022   Little interest or pleasure in doing things Not at all   Feeling down, depressed, irritable, or hopeless Not at all   Total Score PHQ 2 0       Learning Assessment:  Learning Assessment 10/27/2022   PRIMARY LEARNER Patient   PRIMARY LANGUAGE ENGLISH   LEARNER PREFERENCE PRIMARY DEMONSTRATION   ANSWERED BY patient   RELATIONSHIP SELF       Abuse Screening:  Abuse Screening Questionnaire 10/27/2022   Do you ever feel afraid of your partner? N   Are you in a relationship with someone who physically or mentally threatens you? N   Is it safe for you to go home? Y       Fall Risk  Fall Risk Assessment, last 12 mths 10/27/2022   Able to walk? Yes   Fall in past 12 months? 0   Do you feel unsteady? 0   Are you worried about falling 0   Number of falls in past 12 months -   Fall with injury? -           Pt currently taking Anticoagulant therapy? no    Pt currently taking Antiplatelet therapy ? ASA 81 mg once a day      Coordination of Care:  1. Have you been to the ER, urgent care clinic since your last visit? Hospitalized since your last visit? no    2. Have you seen or consulted any other health care providers outside of the [address] [place] [address] since your last visit? Include any pap smears or colon screening.  no

## 2022-10-27 NOTE — PROGRESS NOTES
Reina De La Cruz    Chief Complaint   Patient presents with    New Patient     PCP ref back to re-establish care  Last seen Dr. Sherryle Newer on 08-13-19       HPI    Reina De La Cruz is a 68 y.o. with CAD/ ASCVD, HTN, DM2 with HL here to establish care. He used to see Dr. Jacquelin Harris and then Dr. Sherryle Newer back in 2019, records obtained and reviewed, see below. Should be noted dates for cath and CABG incorrect in his note. He underwent cardiac catheterization on ~2009? which demonstrated   1. 90% stenosis of left main trunk in the mid segment  2. 40% tubular narrowing of proximal LAD  3. Mild left circumflex disease  4. Diffuse 60% proximal mid PDA otherwise patent RCA with mild diffuse disease  5. Normal LV function with EF in the 60% range. He underwent CABG x two which consisted of   1. LIMA to LAD  2. Single SVG to obtuse marginal branch of the circumflex artery. He also has known carotid artery stenosis with 50-69% stenosis of the right internal carotid artery and < 50% stenosis of the left internal carotid artery. He is a nonsmoker. He has a history of hypertension, diabetes mellitus and hypercholesterolemia. He denies a family history of coronary artery disease. He has poor balance and falls. Past Medical History:   Diagnosis Date    Arthropathy, unspecified, other specified sites     Back pain     Diabetes mellitus (HCC)     Easy bruising     Hypertension     Metabolic disease     Skin rash     Vision decreased        History reviewed. No pertinent surgical history. Current Outpatient Medications   Medication Sig Dispense Refill    atorvastatin (LIPITOR) 20 mg tablet Take  by mouth daily. metFORMIN (GLUCOPHAGE) 500 mg tablet Take 1 Tab by mouth daily (with breakfast). (Patient taking differently: Take 250 mg by mouth two (2) times a day.) 1 Tab 0    methimazole (TAPAZOLE) 5 mg tablet Take 10 mg by mouth every Monday, Wednesday, Friday.       CHOLECALCIFEROL, VITAMIN D3, (VITAMIN D3 PO) Take  by mouth.      lisinopril (PRINIVIL) 5 mg tablet Take 40 mg by mouth daily. simvastatin (ZOCOR) 40 mg tablet Take 40 mg by mouth nightly. metoprolol (LOPRESSOR) 50 mg tablet Take  by mouth two (2) times a day. Indications: PATIENT TAKES 100MG IN THE AM 50MG IN THE PM      Aspirin, Buffered 81 mg Tab Take 81 mg by mouth daily. alendronate (FOSAMAX) 35 mg tablet Take  by mouth every seven (7) days. CALCIUM CARBONATE (CALTRATE 600 PO) Take 600 mg by mouth daily. No Known Allergies    Social History     Socioeconomic History    Marital status:      Spouse name: Not on file    Number of children: Not on file    Years of education: Not on file    Highest education level: Not on file   Occupational History    Not on file   Tobacco Use    Smoking status: Never    Smokeless tobacco: Never   Vaping Use    Vaping Use: Never used   Substance and Sexual Activity    Alcohol use: Yes    Drug use: Never    Sexual activity: Not Currently   Other Topics Concern    Not on file   Social History Narrative    Not on file     Social Determinants of Health     Financial Resource Strain: Not on file   Food Insecurity: Not on file   Transportation Needs: Not on file   Physical Activity: Not on file   Stress: Not on file   Social Connections: Not on file   Intimate Partner Violence: Not on file   Housing Stability: Not on file    His neighbor is also my pt  Wife really supportive and helps him    FH: neg for premature ASCVD, no SCD    Review of Systems    14 pt Review of Systems is negative unless otherwise mentioned in the HPI.     Wt Readings from Last 3 Encounters:   10/27/22 84.4 kg (186 lb)   05/18/20 79.1 kg (174 lb 6.4 oz)   08/13/19 81.2 kg (179 lb)     Temp Readings from Last 3 Encounters:   05/18/20 97.8 °F (36.6 °C) (Oral)   12/23/15 98.4 °F (36.9 °C)     BP Readings from Last 3 Encounters:   05/18/20 145/76   08/13/19 150/76   07/12/19 120/60     Pulse Readings from Last 3 Encounters:   05/18/20 82   08/13/19 68   07/12/19 88           Physical Exam:    Visit Vitals  Ht 5' 10\" (1.778 m)   Wt 84.4 kg (186 lb)   BMI 26.69 kg/m²      Physical Exam  HENT:      Head: Normocephalic and atraumatic. Eyes:      General: No scleral icterus. Pupils: Pupils are equal, round, and reactive to light. Cardiovascular:      Rate and Rhythm: Normal rate and regular rhythm. Heart sounds: Murmur heard. No friction rub. No gallop. Pulmonary:      Effort: Pulmonary effort is normal. No respiratory distress. Breath sounds: Normal breath sounds. No wheezing or rales. Chest:      Chest wall: No tenderness. Abdominal:      General: Bowel sounds are normal.      Palpations: Abdomen is soft. Musculoskeletal:      Right lower leg: Edema present. Left lower leg: Edema present. Skin:     General: Skin is warm and dry. Findings: No rash. Neurological:      Mental Status: He is alert and oriented to person, place, and time. EKG today shows: NSR, normal axis and intervals, no ST segment abnormalities    Impression and Plan:  Nash Meckel is a 68 y.o. with:    Remote CAD s/p CABG ~2009 ([facility] pt)  ANTONI r/o AS  Carotid stenosis with bruit  DM2 with HL  HTN  Limited mobility/ poor functional status    Increase atorva to 40 daily for ASCVD protection  ASA 81 mg daily  Needs Pharm nuc for CAD  Echo for murmur/ aortic sclerosis  RTC yearly, will call with results    Thank you for allowing me to participate in the care of your patient, please do not hesitate to call with questions or concerns.     [address],    Lynne Khan DO

## 2022-11-17 ENCOUNTER — TELEPHONE (OUTPATIENT)
Dept: CARDIOLOGY CLINIC | Age: 77
End: 2022-11-17

## 2022-11-21 ENCOUNTER — TELEPHONE (OUTPATIENT)
Dept: CARDIOLOGY CLINIC | Age: 77
End: 2022-11-21

## 2022-11-21 NOTE — TELEPHONE ENCOUNTER
----- Message from Tracy Chowdhury DO sent at 11/18/2022  3:02 PM EST -----  He has a little build up on his aortic valve causing the murmur   But its not blocking the blood flow  Otherwise echo looks good    ----- Message -----  From: Juanita Duran LPN  Sent: [id number]/[id number]   2:41 PM EST  To: Tracy Chowdhury DO    Per your last visit:    Remote CAD s/p CABG ~2009 ([facility] pt)  ANTONI r/o AS  Carotid stenosis with bruit  DM2 with HL  HTN  Limited mobility/ poor functional status     Increase atorva to 40 daily for ASCVD protection  ASA 81 mg daily  Needs Pharm nuc for CAD  Echo for murmur/ aortic sclerosis  RTC yearly, will call with results.

## 2023-07-05 RX ORDER — ATORVASTATIN CALCIUM 40 MG/1
TABLET, FILM COATED ORAL
Qty: 30 TABLET | Refills: 11 | Status: SHIPPED | OUTPATIENT
Start: 2023-07-05

## 2023-11-09 ENCOUNTER — OFFICE VISIT (OUTPATIENT)
Age: 78
End: 2023-11-09

## 2023-11-09 VITALS
HEIGHT: 70 IN | DIASTOLIC BLOOD PRESSURE: 74 MMHG | OXYGEN SATURATION: 98 % | BODY MASS INDEX: 27.06 KG/M2 | SYSTOLIC BLOOD PRESSURE: 126 MMHG | WEIGHT: 189 LBS | HEART RATE: 61 BPM

## 2023-11-09 DIAGNOSIS — Z95.1 PRESENCE OF AORTOCORONARY BYPASS GRAFT: Primary | ICD-10-CM

## 2023-11-09 DIAGNOSIS — E78.00 PURE HYPERCHOLESTEROLEMIA, UNSPECIFIED: ICD-10-CM

## 2023-11-09 DIAGNOSIS — I35.0 NONRHEUMATIC AORTIC (VALVE) STENOSIS: ICD-10-CM

## 2023-11-09 DIAGNOSIS — I65.21 OCCLUSION AND STENOSIS OF RIGHT CAROTID ARTERY: ICD-10-CM

## 2023-11-09 DIAGNOSIS — I10 ESSENTIAL (PRIMARY) HYPERTENSION: ICD-10-CM

## 2023-11-09 RX ORDER — ATORVASTATIN CALCIUM 20 MG/1
20 TABLET, FILM COATED ORAL DAILY
COMMUNITY

## 2023-11-09 RX ORDER — ASPIRIN 81 MG/1
81 TABLET ORAL DAILY
COMMUNITY

## 2023-11-09 ASSESSMENT — PATIENT HEALTH QUESTIONNAIRE - PHQ9
SUM OF ALL RESPONSES TO PHQ9 QUESTIONS 1 & 2: 0
2. FEELING DOWN, DEPRESSED OR HOPELESS: 0
1. LITTLE INTEREST OR PLEASURE IN DOING THINGS: 0
SUM OF ALL RESPONSES TO PHQ QUESTIONS 1-9: 0

## 2023-11-09 NOTE — PROGRESS NOTES
Ace Rey    Chief Complaint   Patient presents with    New Patient     PCP ref back to re-establish care  Last seen Dr. Virginia Dalton on 08-13-19       HPI    Ace Rey is a 68 y.o. with CAD/ ASCVD, HTN, DM2 with HL here to establish care. He used to see Dr. Denis Leroy and then Dr. Virginia Dalton back in 2019, records obtained and reviewed, see below. Should be noted dates for cath and CABG incorrect in his note. He underwent cardiac catheterization on ~2009? which demonstrated   1. 90% stenosis of left main trunk in the mid segment  2. 40% tubular narrowing of proximal LAD  3. Mild left circumflex disease  4. Diffuse 60% proximal mid PDA otherwise patent RCA with mild diffuse disease  5. Normal LV function with EF in the 60% range. He underwent CABG x two which consisted of   1. LIMA to LAD  2. Single SVG to obtuse marginal branch of the circumflex artery. He also has known carotid artery stenosis with 50-69% stenosis of the right internal carotid artery and < 50% stenosis of the left internal carotid artery. He is a nonsmoker. He has a history of hypertension, diabetes mellitus and hypercholesterolemia. He denies a family history of coronary artery disease. He has poor balance and falls. Past Medical History:   Diagnosis Date    Arthropathy, unspecified, other specified sites     Back pain     Diabetes mellitus (HCC)     Easy bruising     Hypertension     Metabolic disease     Skin rash     Vision decreased        History reviewed. No pertinent surgical history. Current Outpatient Medications   Medication Sig Dispense Refill    atorvastatin (LIPITOR) 20 mg tablet Take  by mouth daily. metFORMIN (GLUCOPHAGE) 500 mg tablet Take 1 Tab by mouth daily (with breakfast). (Patient taking differently: Take 250 mg by mouth two (2) times a day.) 1 Tab 0    methimazole (TAPAZOLE) 5 mg tablet Take 10 mg by mouth every Monday, Wednesday, Friday.       CHOLECALCIFEROL, VITAMIN D3, (VITAMIN D3 PO)

## 2023-11-09 NOTE — PROGRESS NOTES
Alen Fenton presents today for   Chief Complaint   Patient presents with    Follow-up     1 year f/u     Edema     Bilateral ankle edema on/off       Alen Fenton preferred language for health care discussion is english/other. Is someone accompanying this pt? no    Is the patient using any DME equipment during OV? no    Depression Screening:  Depression: Not at risk (11/9/2023)    PHQ-2     PHQ-2 Score: 0        Learning Assessment:  Who is the primary learner? Patient    What is the preferred language for health care of the primary learner? ENGLISH    How does the primary learner prefer to learn new concepts? DEMONSTRATION    Answered By patient    Relationship to Learner SELF           Pt currently taking Anticoagulant therapy? no    Pt currently taking Antiplatelet therapy ? no      Coordination of Care:  1. Have you been to the ER, urgent care clinic since your last visit? Hospitalized since your last visit? no    2. Have you seen or consulted any other health care providers outside of the [address] [place] [address] since your last visit? Include any pap smears or colon screening.  no

## 2024-07-09 RX ORDER — ATORVASTATIN CALCIUM 40 MG/1
40 TABLET, FILM COATED ORAL DAILY
Qty: 90 TABLET | Refills: 3 | Status: SHIPPED | OUTPATIENT
Start: 2024-07-09

## 2025-01-15 ENCOUNTER — OFFICE VISIT (OUTPATIENT)
Age: 80
End: 2025-01-15
Payer: MEDICARE

## 2025-01-15 VITALS
WEIGHT: 184 LBS | DIASTOLIC BLOOD PRESSURE: 80 MMHG | BODY MASS INDEX: 36.12 KG/M2 | SYSTOLIC BLOOD PRESSURE: 130 MMHG | HEIGHT: 60 IN | OXYGEN SATURATION: 99 % | HEART RATE: 61 BPM

## 2025-01-15 DIAGNOSIS — E78.00 PURE HYPERCHOLESTEROLEMIA, UNSPECIFIED: ICD-10-CM

## 2025-01-15 DIAGNOSIS — Z95.1 PRESENCE OF AORTOCORONARY BYPASS GRAFT: Primary | ICD-10-CM

## 2025-01-15 DIAGNOSIS — I10 ESSENTIAL (PRIMARY) HYPERTENSION: ICD-10-CM

## 2025-01-15 DIAGNOSIS — I65.21 OCCLUSION AND STENOSIS OF RIGHT CAROTID ARTERY: ICD-10-CM

## 2025-01-15 DIAGNOSIS — I35.0 NONRHEUMATIC AORTIC (VALVE) STENOSIS: ICD-10-CM

## 2025-01-15 PROCEDURE — 1036F TOBACCO NON-USER: CPT | Performed by: INTERNAL MEDICINE

## 2025-01-15 PROCEDURE — 1126F AMNT PAIN NOTED NONE PRSNT: CPT | Performed by: INTERNAL MEDICINE

## 2025-01-15 PROCEDURE — G8428 CUR MEDS NOT DOCUMENT: HCPCS | Performed by: INTERNAL MEDICINE

## 2025-01-15 PROCEDURE — 99214 OFFICE O/P EST MOD 30 MIN: CPT | Performed by: INTERNAL MEDICINE

## 2025-01-15 PROCEDURE — 1123F ACP DISCUSS/DSCN MKR DOCD: CPT | Performed by: INTERNAL MEDICINE

## 2025-01-15 PROCEDURE — G8417 CALC BMI ABV UP PARAM F/U: HCPCS | Performed by: INTERNAL MEDICINE

## 2025-01-15 PROCEDURE — 93000 ELECTROCARDIOGRAM COMPLETE: CPT | Performed by: INTERNAL MEDICINE

## 2025-01-15 PROCEDURE — 3079F DIAST BP 80-89 MM HG: CPT | Performed by: INTERNAL MEDICINE

## 2025-01-15 PROCEDURE — 3075F SYST BP GE 130 - 139MM HG: CPT | Performed by: INTERNAL MEDICINE

## 2025-01-15 ASSESSMENT — ANXIETY QUESTIONNAIRES
7. FEELING AFRAID AS IF SOMETHING AWFUL MIGHT HAPPEN: NOT AT ALL
3. WORRYING TOO MUCH ABOUT DIFFERENT THINGS: NOT AT ALL
2. NOT BEING ABLE TO STOP OR CONTROL WORRYING: NOT AT ALL
GAD7 TOTAL SCORE: 0
6. BECOMING EASILY ANNOYED OR IRRITABLE: NOT AT ALL
5. BEING SO RESTLESS THAT IT IS HARD TO SIT STILL: NOT AT ALL
4. TROUBLE RELAXING: NOT AT ALL
1. FEELING NERVOUS, ANXIOUS, OR ON EDGE: NOT AT ALL

## 2025-01-15 ASSESSMENT — PATIENT HEALTH QUESTIONNAIRE - PHQ9
SUM OF ALL RESPONSES TO PHQ QUESTIONS 1-9: 0
SUM OF ALL RESPONSES TO PHQ9 QUESTIONS 1 & 2: 0
SUM OF ALL RESPONSES TO PHQ QUESTIONS 1-9: 0
1. LITTLE INTEREST OR PLEASURE IN DOING THINGS: NOT AT ALL
SUM OF ALL RESPONSES TO PHQ QUESTIONS 1-9: 0
2. FEELING DOWN, DEPRESSED OR HOPELESS: NOT AT ALL
SUM OF ALL RESPONSES TO PHQ QUESTIONS 1-9: 0

## 2025-01-15 NOTE — PROGRESS NOTES
Timbo Ricardo presents today for   Chief Complaint   Patient presents with    Follow-up     1 year       Timbo Ricardo preferred language for health care discussion is english/other.    Is someone accompanying this pt? yes    Is the patient using any DME equipment during OV? no    Depression Screening:  Depression: Not at risk (1/15/2025)    PHQ-2     PHQ-2 Score: 0        Learning Assessment:  Who is the primary learner? Patient    What is the preferred language for health care of the primary learner? ENGLISH    How does the primary learner prefer to learn new concepts? DEMONSTRATION    Answered By patient    Relationship to Learner SELF           Pt currently taking Anticoagulant therapy? no    Pt currently taking Antiplatelet therapy ? no      Coordination of Care:  1. Have you been to the ER, urgent care clinic since your last visit? Hospitalized since your last visit? no    2. Have you seen or consulted any other health care providers outside of the [facility] since your last visit? Include any pap smears or colon screening. no    
  05/18/20 82   08/13/19 68   07/12/19 88           Physical Exam:    Visit Vitals  Ht 5' 10\" (1.778 m)   Wt 84.4 kg (186 lb)   BMI 26.69 kg/m²      Physical Exam  HENT:      Head: Normocephalic and atraumatic.   Eyes:      General: No scleral icterus.     Pupils: Pupils are equal, round, and reactive to light.   Cardiovascular:      Rate and Rhythm: Normal rate and regular rhythm.      Heart sounds: Murmur heard.     No friction rub. No gallop.   Pulmonary:      Effort: Pulmonary effort is normal. No respiratory distress.      Breath sounds: Normal breath sounds. No wheezing or rales.   Chest:      Chest wall: No tenderness.   Abdominal:      General: Bowel sounds are normal.      Palpations: Abdomen is soft.   Musculoskeletal:      Right lower leg: Edema present.      Left lower leg: Edema present.   Skin:     General: Skin is warm and dry.      Findings: No rash.   Neurological:      Mental Status: He is alert and oriented to person, place, and time.       EKG today shows: NSR, normal axis and intervals, no ST segment abnormalities      Impression and Plan:  Timbo Ricardo is a 76 y.o. with:    Remote CAD s/p CABG ~2009 ([facility] pt)  MAYLIN r/o AS  Carotid stenosis with bruit  DM2 with HL  HTN  Limited mobility/ poor functional status    Nuc showed small area inferior wall- without angina and with normal EF we decided med therapy. He's on BB + ASA + HI Statin  Going to get back to walking  RTC yearly with EKG, call sooner if needed    Thank you for allowing me to participate in the care of your patient, please do not hesitate to call with questions or concerns.    Regards,    Sindi Novoa, DO

## 2025-06-24 RX ORDER — ATORVASTATIN CALCIUM 40 MG/1
40 TABLET, FILM COATED ORAL DAILY
Qty: 90 TABLET | Refills: 3 | Status: SHIPPED | OUTPATIENT
Start: 2025-06-24

## 2025-06-24 NOTE — TELEPHONE ENCOUNTER
PCP: Tammie Junior MD    Last appt:  01/15/2025  Future Appointments   Date Time Provider Department Center   1/15/2026  9:20 AM Sindi Novoa,  [facility] AMB       Requested Prescriptions     Pending Prescriptions Disp Refills    atorvastatin (LIPITOR) 40 MG tablet [Pharmacy Med Name: Atorvastatin Calcium 40 MG Oral Tablet] 90 tablet 3     Sig: Take 1 tablet by mouth once daily       Request for a 30 or 90 day supply? Provider Discretion    Pharmacy: [address]    Other Comments:    Per the last office note:    Nuc showed small area inferior wall- without angina and with normal EF we decided med therapy. He's on BB + ASA + HI Statin